# Patient Record
Sex: FEMALE | URBAN - METROPOLITAN AREA
[De-identification: names, ages, dates, MRNs, and addresses within clinical notes are randomized per-mention and may not be internally consistent; named-entity substitution may affect disease eponyms.]

---

## 2024-02-07 ENCOUNTER — OV NP (OUTPATIENT)
Dept: URBAN - METROPOLITAN AREA CLINIC 109 | Facility: CLINIC | Age: 54
End: 2024-02-07
Payer: COMMERCIAL

## 2024-02-07 VITALS
BODY MASS INDEX: 18.54 KG/M2 | HEART RATE: 66 BPM | DIASTOLIC BLOOD PRESSURE: 69 MMHG | SYSTOLIC BLOOD PRESSURE: 125 MMHG | HEIGHT: 66 IN | WEIGHT: 115.4 LBS | TEMPERATURE: 97.2 F

## 2024-02-07 DIAGNOSIS — K62.89 PERIANAL PAIN: ICD-10-CM

## 2024-02-07 DIAGNOSIS — Z86.010 PERSONAL HISTORY OF COLONIC POLYPS: ICD-10-CM

## 2024-02-07 DIAGNOSIS — K60.2 ANAL FISSURE: ICD-10-CM

## 2024-02-07 PROBLEM — 30037006: Status: ACTIVE | Noted: 2024-02-07

## 2024-02-07 PROBLEM — 428283002: Status: ACTIVE | Noted: 2024-02-07

## 2024-02-07 PROCEDURE — 99204 OFFICE O/P NEW MOD 45 MIN: CPT | Performed by: INTERNAL MEDICINE

## 2024-02-07 NOTE — HPI-TODAY'S VISIT:
The patient presents for evaluation of anorectal pain.  The patien reports having perianal pain x 1 month.  painful with sitting and at rest.  chronic constipation.  remote h/o anal fissure s/p surgical repair ~30 years ago.  has been treated empirically for hemorrhoids x 1 month without improvement.  was seen by pcp yesterday and reports severe pain during LUAN.  denies hematochezia.  colonoscopy 3 years ago with adenomatous polyp removed (5 year recommendation for repeat).

## 2024-02-21 ENCOUNTER — OV EP (OUTPATIENT)
Dept: URBAN - METROPOLITAN AREA CLINIC 109 | Facility: CLINIC | Age: 54
End: 2024-02-21
Payer: COMMERCIAL

## 2024-02-21 VITALS
SYSTOLIC BLOOD PRESSURE: 157 MMHG | HEIGHT: 66 IN | WEIGHT: 116.4 LBS | HEART RATE: 68 BPM | BODY MASS INDEX: 18.71 KG/M2 | DIASTOLIC BLOOD PRESSURE: 86 MMHG

## 2024-02-21 DIAGNOSIS — K60.2 ANAL FISSURE: ICD-10-CM

## 2024-02-21 DIAGNOSIS — K59.09 CHRONIC CONSTIPATION: ICD-10-CM

## 2024-02-21 DIAGNOSIS — K62.89 PERIANAL PAIN: ICD-10-CM

## 2024-02-21 DIAGNOSIS — Z86.010 PERSONAL HISTORY OF COLONIC POLYPS: ICD-10-CM

## 2024-02-21 PROCEDURE — 99214 OFFICE O/P EST MOD 30 MIN: CPT | Performed by: INTERNAL MEDICINE

## 2024-02-21 NOTE — HPI-TODAY'S VISIT:
The patient presents for f/u appt.  tearful/anxious due to traffic/accident on route here and being late to appt.  has had improvement in [place]-anal pain since last appt.  still happens on occasion with sharp pains but less severe from a couple weeks ago.  pain is worse with constipation/straining.  denies gi bleeding.

## 2024-02-21 NOTE — EXAM-PHYSICAL EXAM
rectal: small external skin tags, minimal pain on LUAN.  chaperone (Gina Lopez) was in the room during the exam

## 2024-03-05 ENCOUNTER — LAB (OUTPATIENT)
Dept: URBAN - METROPOLITAN AREA CLINIC 109 | Facility: CLINIC | Age: 54
End: 2024-03-05

## 2024-03-20 ENCOUNTER — OV EP (OUTPATIENT)
Dept: URBAN - METROPOLITAN AREA CLINIC 109 | Facility: CLINIC | Age: 54
End: 2024-03-20

## 2024-03-22 ENCOUNTER — COLON (OUTPATIENT)
Dept: URBAN - METROPOLITAN AREA MEDICAL CENTER 16 | Facility: MEDICAL CENTER | Age: 54
End: 2024-03-22

## 2025-07-07 ENCOUNTER — OFFICE VISIT (OUTPATIENT)
Dept: URBAN - METROPOLITAN AREA CLINIC 98 | Facility: CLINIC | Age: 55
End: 2025-07-07

## 2025-07-17 ENCOUNTER — OFFICE VISIT (OUTPATIENT)
Dept: URBAN - METROPOLITAN AREA CLINIC 98 | Facility: CLINIC | Age: 55
End: 2025-07-17

## 2025-07-17 ENCOUNTER — LAB OUTSIDE AN ENCOUNTER (OUTPATIENT)
Dept: URBAN - METROPOLITAN AREA CLINIC 98 | Facility: CLINIC | Age: 55
End: 2025-07-17

## 2025-07-17 ENCOUNTER — DASHBOARD ENCOUNTERS (OUTPATIENT)
Age: 55
End: 2025-07-17

## 2025-07-17 DIAGNOSIS — K62.89 ANAL PAIN: ICD-10-CM

## 2025-07-17 DIAGNOSIS — K30 INDIGESTION: ICD-10-CM

## 2025-07-17 DIAGNOSIS — K21.9 CHRONIC GERD: ICD-10-CM

## 2025-07-17 PROBLEM — 162031009: Status: ACTIVE | Noted: 2025-07-17

## 2025-07-17 PROBLEM — 235595009: Status: ACTIVE | Noted: 2025-07-17

## 2025-07-17 PROCEDURE — 99214 OFFICE O/P EST MOD 30 MIN: CPT | Performed by: INTERNAL MEDICINE

## 2025-07-17 NOTE — HPI-TODAY'S VISIT:
Ms. Quiros is a 54 yo F presenting for followup visit for acid reflux.  Last visit on 2/1/24 with Dr. Oscar Bentley.   H/o GERD dx in early 20s.  Trying to control it with diet.  Now coffee triggers heartburn. Patient prescribed Pantoprazole 20 mg once per day. Then she was prescribed Omeprazole for 30 days but symptoms are still not better.  Having more indigestion and belching.  Last EGD in 3/2016 in [place]  No trouble swallowing  She has IBS-C, no recent constipation  H/o treatment for anal fissure in 2024.  Now having anorectal pain for the last few days. Tried hydrocortisone  without improvement Nitroglycerin did not work for fissure in the past, had to have procedure with colorectal surgeon. No blood in stools.  No unintentional weight loss.   I reviewed:  3/22/24 colonoscopy- small int. hemorrhoids 6/3/25 lipids, CMP, CBC

## 2025-07-17 NOTE — EXAM-PHYSICAL EXAM
Constitutional: well-developed, normal communication ability.   Eyes: Conjunctivae and eyelids appear normal, no scleral icterus. Respiratory:  symmetric expansion of chest wall, normal work of breathing   Gastrointestinal:  normoactive bowel sounds, soft, no tenderness, no rebound tenderness, no shifting dullness, no organomegaly, Rectal exam- mild perineal erythema but no fissures seen, no hemorrhoids, no blood in the stool, not tender on exam, small anal skin tag, normal maneuvers. Chaperone declined by patient   Musculoskeletal: normal gait and station, no tenderness present.   Skin: No jaundice   Neurologic: Oriented to person, place, time. Short term memory intact.    Psychiatric: Normal mood and appropriate affect.

## 2025-07-29 ENCOUNTER — OFFICE VISIT (OUTPATIENT)
Dept: URBAN - METROPOLITAN AREA SURGERY CENTER 18 | Facility: SURGERY CENTER | Age: 55
End: 2025-07-29

## 2025-07-31 ENCOUNTER — TELEPHONE ENCOUNTER (OUTPATIENT)
Dept: URBAN - METROPOLITAN AREA CLINIC 98 | Facility: CLINIC | Age: 55
End: 2025-07-31

## 2025-08-13 ENCOUNTER — OFFICE VISIT (OUTPATIENT)
Dept: URBAN - METROPOLITAN AREA CLINIC 98 | Facility: CLINIC | Age: 55
End: 2025-08-13

## 2025-08-26 ENCOUNTER — OFFICE VISIT (OUTPATIENT)
Dept: URBAN - METROPOLITAN AREA SURGERY CENTER 18 | Facility: SURGERY CENTER | Age: 55
End: 2025-08-26

## 2025-08-26 ENCOUNTER — CLAIMS CREATED FROM THE CLAIM WINDOW (OUTPATIENT)
Dept: URBAN - METROPOLITAN AREA CLINIC 4 | Facility: CLINIC | Age: 55
End: 2025-08-26

## 2025-08-26 DIAGNOSIS — K21.9 GASTRO-ESOPHAGEAL REFLUX DISEASE WITHOUT ESOPHAGITIS: ICD-10-CM

## 2025-08-26 DIAGNOSIS — K31.7 POLYP OF STOMACH AND DUODENUM: ICD-10-CM

## 2025-08-26 DIAGNOSIS — K31.89 OTHER DISEASES OF STOMACH AND DUODENUM: ICD-10-CM

## 2025-08-26 DIAGNOSIS — K29.70 GASTRITIS, UNSPECIFIED, WITHOUT BLEEDING: ICD-10-CM

## 2025-08-26 PROCEDURE — 88312 SPECIAL STAINS GROUP 1: CPT | Performed by: PATHOLOGY

## 2025-08-26 PROCEDURE — 88305 TISSUE EXAM BY PATHOLOGIST: CPT | Performed by: PATHOLOGY

## 2025-08-26 PROCEDURE — 88342 IMHCHEM/IMCYTCHM 1ST ANTB: CPT | Performed by: PATHOLOGY
